# Patient Record
Sex: MALE | Race: WHITE | Employment: OTHER | URBAN - METROPOLITAN AREA
[De-identification: names, ages, dates, MRNs, and addresses within clinical notes are randomized per-mention and may not be internally consistent; named-entity substitution may affect disease eponyms.]

---

## 2024-09-16 ENCOUNTER — HOSPITAL ENCOUNTER (OUTPATIENT)
Dept: GENERAL RADIOLOGY | Age: 64
Discharge: HOME | End: 2024-09-18
Payer: OTHER GOVERNMENT

## 2024-09-16 ENCOUNTER — HOSPITAL ENCOUNTER (OUTPATIENT)
Age: 64
Discharge: HOME | End: 2024-09-18
Payer: OTHER GOVERNMENT

## 2024-09-16 DIAGNOSIS — R52 PAIN: ICD-10-CM

## 2024-09-16 PROCEDURE — 73521 X-RAY EXAM HIPS BI 2 VIEWS: CPT

## 2024-10-04 ENCOUNTER — OFFICE VISIT (OUTPATIENT)
Dept: URGENT CARE | Facility: URGENT CARE | Age: 64
End: 2024-10-04
Payer: MEDICARE

## 2024-10-04 ENCOUNTER — HOSPITAL ENCOUNTER (OUTPATIENT)
Dept: RADIOLOGY | Facility: CLINIC | Age: 64
Discharge: HOME | End: 2024-10-04
Payer: MEDICARE

## 2024-10-04 VITALS
SYSTOLIC BLOOD PRESSURE: 130 MMHG | OXYGEN SATURATION: 94 % | DIASTOLIC BLOOD PRESSURE: 82 MMHG | BODY MASS INDEX: 34.74 KG/M2 | RESPIRATION RATE: 18 BRPM | TEMPERATURE: 98.6 F | WEIGHT: 255.95 LBS | HEART RATE: 61 BPM

## 2024-10-04 DIAGNOSIS — L02.415 CUTANEOUS ABSCESS OF RIGHT LOWER EXTREMITY: ICD-10-CM

## 2024-10-04 DIAGNOSIS — J20.9 BRONCHOSPASM WITH BRONCHITIS, ACUTE: ICD-10-CM

## 2024-10-04 DIAGNOSIS — J20.9 BRONCHOSPASM WITH BRONCHITIS, ACUTE: Primary | ICD-10-CM

## 2024-10-04 PROCEDURE — 71046 X-RAY EXAM CHEST 2 VIEWS: CPT

## 2024-10-04 PROCEDURE — 73590 X-RAY EXAM OF LOWER LEG: CPT | Mod: RT

## 2024-10-04 RX ORDER — ALBUTEROL SULFATE 0.83 MG/ML
2.5 SOLUTION RESPIRATORY (INHALATION) ONCE
Status: COMPLETED | OUTPATIENT
Start: 2024-10-04 | End: 2024-10-04

## 2024-10-04 RX ORDER — DOXYCYCLINE HYCLATE 100 MG
100 TABLET ORAL 2 TIMES DAILY
Qty: 20 TABLET | Refills: 0 | Status: SHIPPED | OUTPATIENT
Start: 2024-10-04 | End: 2024-10-04

## 2024-10-04 RX ORDER — DOXYCYCLINE 100 MG/1
100 CAPSULE ORAL 2 TIMES DAILY
Qty: 20 CAPSULE | Refills: 0 | Status: SHIPPED | OUTPATIENT
Start: 2024-10-04 | End: 2024-10-14

## 2024-10-04 RX ORDER — ALBUTEROL SULFATE 90 UG/1
2 INHALANT RESPIRATORY (INHALATION) EVERY 6 HOURS PRN
Qty: 8.5 G | Refills: 0 | Status: SHIPPED | OUTPATIENT
Start: 2024-10-04

## 2024-10-04 NOTE — PATIENT INSTRUCTIONS
Right Leg Foreign Body- 2 splinters removed with incision and drainage. Wound culture obtained will call if need to switch antibiotic. Xray right lower leg reviewed, no obvious foreign body present.      Right Leg abscess- drained moderate purulent discharge. Start Doxcycyline twice a day x 10 days, no dairy products 1 hr before or after antibiotic. Take with food and probiotic to prevent upset stomach and diarrhea.    Acute bronchospasm with Bronchitis- Albuterol neb trialed. Pulse ox improved from 94% to 98%. Continue Albuterol inhaler with spacer 2 puffs every 6h as needed for cough or wheeze. GO to ER if worsening breathing or chest pain or dizziness

## 2024-10-04 NOTE — PROGRESS NOTES
Subjective   Patient ID: Dick Willingham Jr. is a 63 y.o. male. They present today with a chief complaint of Cough, Headache, Sinusitis, and Other (Pt states that he has had a sore on his right ankle x 2 weeks).    History of Present Illness  HPI  Pt reports unknown status of tetanus, agreeable to booster. Pt reports injury occurred 1.5 weeks ago. He was fixing back porch and a treated piece of lumbar struck his right leg; he tried to remove it by squeezing area, washing it with soap and water and applying neosporin ointment. He reports  bleeding briefly then scabbed over. Area is very painful to touch. No fever. No drainage from scab.     He reports productive cough, nasal congestion x 2 weeks worse last 2 days. He reports wheeze at night. No SOB. He is a former smoker quit '94. He was exposed to daughter with bronchitis.     Past Medical History  Allergies as of 10/04/2024    (No Known Allergies)       (Not in a hospital admission)       Past Medical History:   Diagnosis Date    Personal history of traumatic brain injury     History of concussion       Past Surgical History:   Procedure Laterality Date    OTHER SURGICAL HISTORY  12/10/2018    Back surgery    OTHER SURGICAL HISTORY  05/24/2019    Colonoscopy    OTHER SURGICAL HISTORY  04/29/2019    Knee replacement        reports that he has never smoked. He has never used smokeless tobacco. He reports that he does not use drugs.    Review of Systems  Review of Systems                               Objective    Vitals:    10/04/24 1719   BP: 130/82   Pulse: 61   Resp: 18   Temp: 37 °C (98.6 °F)   SpO2: 94%   Weight: 116 kg (255 lb 15.3 oz)     No LMP for male patient.    Physical Exam  Constitutional:       General: He is not in acute distress.     Appearance: Normal appearance.      Comments: Pleasant white male   HENT:      Head: Normocephalic and atraumatic.      Right Ear: Tympanic membrane, ear canal and external ear normal.      Left Ear: Tympanic membrane, ear  canal and external ear normal.      Nose: Congestion and rhinorrhea present.      Mouth/Throat:      Mouth: Mucous membranes are moist.      Pharynx: No posterior oropharyngeal erythema.   Eyes:      Extraocular Movements: Extraocular movements intact.      Conjunctiva/sclera: Conjunctivae normal.      Pupils: Pupils are equal, round, and reactive to light.   Cardiovascular:      Rate and Rhythm: Regular rhythm.      Heart sounds: No murmur heard.  Pulmonary:      Effort: Pulmonary effort is normal.      Breath sounds: No wheezing.      Comments: Mildly diminished breath sounds. Preneb O2 94% and post neb O2 98%, mildly improved aeration after neb  Musculoskeletal:      Cervical back: Normal range of motion and neck supple.   Lymphadenopathy:      Cervical: No cervical adenopathy.   Skin:     General: Skin is warm and dry.      Comments: 1-2 cm circular area with dark scab and surrounding erythema, tender to palpation, swelling with purulent fluid. Area cleaned with iodine. Anesthesia lidocaine 1.8ml. removed 2 1 cm splinters from wound with purulent discharge, irrigated with sterile water   Neurological:      General: No focal deficit present.      Mental Status: He is alert and oriented to person, place, and time.   Psychiatric:         Mood and Affect: Mood normal.         Incision and Drainage    Date/Time: 10/4/2024 6:31 PM    Performed by: Alize Claire PA-C  Authorized by: Alize Claire PA-C    Consent:     Consent obtained:  Verbal    Risks discussed:  Bleeding, incomplete drainage, pain and infection  Location:     Type:  Abscess    Location:  Lower extremity    Lower extremity location:  Leg    Leg location:  R lower leg  Pre-procedure details:     Skin preparation:  Povidone-iodine  Anesthesia:     Anesthesia method:  Local infiltration    Local anesthetic:  Lidocaine 1% w/o epi  Procedure type:     Complexity:  Simple  Procedure details:     Incision types:  Single straight    Incision  depth:  Subcutaneous    Wound management:  Irrigated with saline    Drainage:  Purulent    Drainage amount:  Moderate    Wound treatment:  Wound left open  Post-procedure details:     Procedure completion:  Tolerated      Point of Care Test & Imaging Results from this visit  No results found for this visit on 10/04/24.   XR tibia fibula right 2 views    Result Date: 10/4/2024  Interpreted By:  Nathen Raymond, STUDY: XR TIBIA FIBULA RIGHT 2 VIEWS; ;  10/4/2024 7:15 pm   INDICATION: Signs/Symptoms:removed 2 splinters from RLE, evaluate for FB.   ,L02.415 Cutaneous abscess of right lower limb   COMPARISON: None.   ACCESSION NUMBER(S): [id number]   ORDERING CLINICIAN: KAILA ELLINGTON   FINDINGS: Right tibia and fibula, two views   Postsurgical changes of the distal fibula and the distal tibia. The hardware is intact. There is no fracture. There is no dislocation. There are no degenerative changes. There is no lytic or sclerotic lesion. There is no soft tissue abnormality seen.       ORIF of bimalleolar fractures. No acute abnormality   MACRO: None   Signed by: Nathen Raymond 10/4/2024 7:25 PM Dictation workstation:   [id number]    XR chest 2 views    Result Date: 10/4/2024  Interpreted By:  Nathen Raymond, STUDY: XR CHEST 2 VIEWS;  10/4/2024 7:14 pm   INDICATION: Signs/Symptoms:bronchospasm, productive cough 1.5 weeks, O2 94%.   ,J20.9 Acute bronchitis, unspecified   COMPARISON: 08/27/2021   ACCESSION NUMBER(S): [id number]   ORDERING CLINICIAN: KAILA ELLINGTON   FINDINGS:         CARDIOMEDIASTINAL SILHOUETTE: Cardiomediastinal silhouette is normal in size and configuration.   LUNGS: Lungs are clear.   ABDOMEN: No remarkable upper abdominal findings.   BONES: No acute osseous changes.       1.  No evidence of acute cardiopulmonary process.       MACRO: None   Signed by: Nathen Raymond 10/4/2024 7:24 PM Dictation workstation:   [id number]     Diagnostic study results (if any) were reviewed by Kaila  ANITA Claire PA-C.    Assessment/Plan   Allergies, medications, history, and pertinent labs/EKGs/Imaging reviewed by Alize Claire PA-C.     Orders and Diagnoses  Diagnoses and all orders for this visit:  Bronchospasm with bronchitis, acute  -     albuterol 2.5 mg /3 mL (0.083 %) nebulizer solution 2.5 mg  -     XR chest 2 views; Future  -     albuterol (ProAir HFA) 90 mcg/actuation inhaler; Inhale 2 puffs every 6 hours if needed for wheezing or shortness of breath. Use with spacer  -     Aerochamber Spacer Device  Cutaneous abscess of right lower extremity  -     Tissue/Wound Culture/Smear  -     XR tibia fibula right 2 views; Future  -     doxycycline (Vibramycin) 100 mg capsule; Take 1 capsule (100 mg) by mouth 2 times a day for 10 days. Take with at least 8 ounces (large glass) of water, do not lie down for 30 minutes after  Other orders  -     Incision and Drainage  -     Tdap vaccine, age 7 years and older (ADACEL)      62 yo M presents with multiple complaints. He reports a injury to right lower leg with  wood puncturing his skin and bleeding x 1.5 weeks ago. He does not recall last tetanus booster and agreeable to receive today; Td administered with no reaction. Pt agreeable to I&D, reviewed risks, removed 2 small splinters and irrigated wound. Xray reviewed with no FB noted. Wound was covered with nonstick bandaid and bacitracin zinc ointment. Wound culture obtained. Advised to start Doxycycline twice a day x 10 days, take with food and probiotic otc. Advised ER if fever, worsening leg pain, wound redness and discharge.   Advised ER if worsening breathing. Pt improved with albuterol neb, advised to continue Albuterol inhaler with spacer every 6hr as needed. Consider steroid taper if wheeze and cough not improving in next 3 days. Reviewed Chest xray, peribronchial cuffing, no pneumonia. Advised to use OTC expectorant with plenty of fluids.    Administrations This Visit       albuterol 2.5 mg /3 mL  (0.083 %) nebulizer solution 2.5 mg       Admin Date  10/04/2024 Action  Given Dose  2.5 mg Route  nebulization Documented By  Joellen Rueda MA                    Patient disposition: Home    Electronically signed by Alize Claire PA-C  7:40 PM

## 2024-10-06 LAB
BACTERIA SPEC CULT: ABNORMAL
GRAM STN SPEC: ABNORMAL
GRAM STN SPEC: ABNORMAL

## 2024-10-07 LAB
BACTERIA SPEC CULT: ABNORMAL
BACTERIA SPEC CULT: ABNORMAL
GRAM STN SPEC: ABNORMAL
GRAM STN SPEC: ABNORMAL